# Patient Record
Sex: FEMALE | Race: WHITE
[De-identification: names, ages, dates, MRNs, and addresses within clinical notes are randomized per-mention and may not be internally consistent; named-entity substitution may affect disease eponyms.]

---

## 2018-01-24 ENCOUNTER — HOSPITAL ENCOUNTER (INPATIENT)
Dept: HOSPITAL 1 - ED | Age: 33
LOS: 3 days | Discharge: NURSING, REHAB OR LONG-TERM CARE | DRG: 342 | End: 2018-01-27
Attending: FAMILY MEDICINE | Admitting: FAMILY MEDICINE
Payer: MEDICAID

## 2018-01-24 VITALS — DIASTOLIC BLOOD PRESSURE: 83 MMHG | SYSTOLIC BLOOD PRESSURE: 119 MMHG

## 2018-01-24 VITALS
BODY MASS INDEX: 18.91 KG/M2 | HEIGHT: 60 IN | WEIGHT: 96.3 LBS | BODY MASS INDEX: 18.91 KG/M2 | WEIGHT: 96.3 LBS | HEIGHT: 60 IN

## 2018-01-24 VITALS — DIASTOLIC BLOOD PRESSURE: 74 MMHG | SYSTOLIC BLOOD PRESSURE: 113 MMHG

## 2018-01-24 VITALS — SYSTOLIC BLOOD PRESSURE: 113 MMHG | DIASTOLIC BLOOD PRESSURE: 68 MMHG

## 2018-01-24 DIAGNOSIS — E44.0: ICD-10-CM

## 2018-01-24 DIAGNOSIS — G80.9: ICD-10-CM

## 2018-01-24 DIAGNOSIS — S42.292A: Primary | ICD-10-CM
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
## 2018-01-24 DIAGNOSIS — F84.0: ICD-10-CM

## 2018-01-24 DIAGNOSIS — E03.9: ICD-10-CM

## 2018-01-24 DIAGNOSIS — F73: ICD-10-CM
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
## 2018-01-24 DIAGNOSIS — E87.8: ICD-10-CM

## 2018-01-24 DIAGNOSIS — K59.00: ICD-10-CM

## 2018-01-24 LAB
ALBUMIN SERPL-MCNC: 3 G/DL (ref 3.4–5)
ALP SERPL-CCNC: 129 U/L (ref 46–116)
ALT SERPL-CCNC: 29 U/L (ref 14–59)
AMYLASE SERPL-CCNC: 51 U/L (ref 25–115)
AST SERPL-CCNC: 28 U/L (ref 15–37)
BASOPHILS NFR BLD: 0.1 % (ref 0–2)
BILIRUB SERPL-MCNC: 0.34 MG/DL (ref 0.2–1)
BUN SERPL-MCNC: 11 MG/DL (ref 7–18)
CALCIUM SERPL-MCNC: 8.3 MG/DL (ref 8.5–10.1)
CHLORIDE SERPL-SCNC: 109 MMOL/L (ref 98–107)
CHOLEST SERPL-MCNC: 120 MG/DL (ref ?–200)
CHOLEST/HDLC SERPL: 1.7 MG/DL
CO2 SERPL-SCNC: 24.3 MMOL/L (ref 21–32)
CREAT SERPL-MCNC: 0.5 MG/DL (ref 0.6–1)
ERYTHROCYTE [DISTWIDTH] IN BLOOD BY AUTOMATED COUNT: 13.5 % (ref 11.5–14.5)
GFR SERPLBLD BASED ON 1.73 SQ M-ARVRAT: > 60 ML/MIN
GLUCOSE SERPL-MCNC: 121 MG/DL (ref 74–106)
HDLC SERPL-MCNC: 69 MG/DL (ref 40–60)
LIPASE SERPL-CCNC: 257 IU/L (ref 73–393)
MAGNESIUM SERPL-MCNC: 1.9 MG/DL (ref 1.8–2.4)
PHOSPHATE SERPL-MCNC: 2.8 MG/DL (ref 2.5–4.9)
PLATELET # BLD: 198 X10^3MCL (ref 130–400)
POTASSIUM SERPL-SCNC: 4.3 MMOL/L (ref 3.5–5.1)
PROT SERPL-MCNC: 6 G/DL (ref 6.4–8.2)
SODIUM SERPL-SCNC: 141 MMOL/L (ref 136–145)
T3 SERPL-MCNC: 0.65 NG/ML
T3RU NFR SERPL: 37 % UPTAKE (ref 30–39)
T4 FREE SERPL-MCNC: 0.94 NG/DL (ref 0.76–1.46)
T4 SERPL-MCNC: 6.2 UG/DL (ref 4.7–13.3)
T4/T3 UPTAKE INDEX SERPL: 2.3 UG/DL (ref 1.4–4.5)
TRIGL SERPL-MCNC: 59 MG/DL (ref ?–150)

## 2018-01-25 VITALS — DIASTOLIC BLOOD PRESSURE: 66 MMHG | SYSTOLIC BLOOD PRESSURE: 125 MMHG

## 2018-01-25 VITALS — SYSTOLIC BLOOD PRESSURE: 134 MMHG | DIASTOLIC BLOOD PRESSURE: 77 MMHG

## 2018-01-25 VITALS — SYSTOLIC BLOOD PRESSURE: 112 MMHG | DIASTOLIC BLOOD PRESSURE: 67 MMHG

## 2018-01-25 LAB
BASOPHILS NFR BLD: 0.2 % (ref 0–2)
BUN SERPL-MCNC: 10 MG/DL (ref 7–18)
CALCIUM SERPL-MCNC: 8.6 MG/DL (ref 8.5–10.1)
CHLORIDE SERPL-SCNC: 108 MMOL/L (ref 98–107)
CO2 SERPL-SCNC: 24.9 MMOL/L (ref 21–32)
CREAT SERPL-MCNC: 0.5 MG/DL (ref 0.6–1)
ERYTHROCYTE [DISTWIDTH] IN BLOOD BY AUTOMATED COUNT: 13.4 % (ref 11.5–14.5)
GFR SERPLBLD BASED ON 1.73 SQ M-ARVRAT: > 60 ML/MIN
GLUCOSE SERPL-MCNC: 104 MG/DL (ref 74–106)
PLATELET # BLD: 187 X10^3MCL (ref 130–400)
POTASSIUM SERPL-SCNC: 4.2 MMOL/L (ref 3.5–5.1)
SODIUM SERPL-SCNC: 141 MMOL/L (ref 136–145)

## 2018-01-26 VITALS — DIASTOLIC BLOOD PRESSURE: 72 MMHG | SYSTOLIC BLOOD PRESSURE: 138 MMHG

## 2018-01-26 VITALS — DIASTOLIC BLOOD PRESSURE: 77 MMHG | SYSTOLIC BLOOD PRESSURE: 140 MMHG

## 2018-01-26 VITALS — SYSTOLIC BLOOD PRESSURE: 136 MMHG | DIASTOLIC BLOOD PRESSURE: 94 MMHG

## 2018-01-26 LAB
BASOPHILS NFR BLD: 0.3 % (ref 0–2)
BUN SERPL-MCNC: 8 MG/DL (ref 7–18)
CALCIUM SERPL-MCNC: 8.7 MG/DL (ref 8.5–10.1)
CHLORIDE SERPL-SCNC: 103 MMOL/L (ref 98–107)
CO2 SERPL-SCNC: 27.6 MMOL/L (ref 21–32)
CREAT SERPL-MCNC: 0.4 MG/DL (ref 0.6–1)
ERYTHROCYTE [DISTWIDTH] IN BLOOD BY AUTOMATED COUNT: 13.6 % (ref 11.5–14.5)
GFR SERPLBLD BASED ON 1.73 SQ M-ARVRAT: > 60 ML/MIN
GLUCOSE SERPL-MCNC: 94 MG/DL (ref 74–106)
PLATELET # BLD: 189 X10^3MCL (ref 130–400)
POTASSIUM SERPL-SCNC: 3.4 MMOL/L (ref 3.5–5.1)
SODIUM SERPL-SCNC: 129 MMOL/L (ref 136–145)

## 2018-01-27 VITALS — SYSTOLIC BLOOD PRESSURE: 133 MMHG | DIASTOLIC BLOOD PRESSURE: 77 MMHG

## 2018-01-27 VITALS — SYSTOLIC BLOOD PRESSURE: 135 MMHG | DIASTOLIC BLOOD PRESSURE: 82 MMHG

## 2018-01-27 VITALS — DIASTOLIC BLOOD PRESSURE: 80 MMHG | SYSTOLIC BLOOD PRESSURE: 142 MMHG

## 2018-01-27 LAB
BASOPHILS NFR BLD: 0.3 % (ref 0–2)
BUN SERPL-MCNC: 8 MG/DL (ref 7–18)
CALCIUM SERPL-MCNC: 8.8 MG/DL (ref 8.5–10.1)
CHLORIDE SERPL-SCNC: 108 MMOL/L (ref 98–107)
CO2 SERPL-SCNC: 24.8 MMOL/L (ref 21–32)
CREAT SERPL-MCNC: 0.5 MG/DL (ref 0.6–1)
ERYTHROCYTE [DISTWIDTH] IN BLOOD BY AUTOMATED COUNT: 13.5 % (ref 11.5–14.5)
GFR SERPLBLD BASED ON 1.73 SQ M-ARVRAT: > 60 ML/MIN
GLUCOSE SERPL-MCNC: 111 MG/DL (ref 74–106)
PLATELET # BLD: 217 X10^3MCL (ref 130–400)
POTASSIUM SERPL-SCNC: 4 MMOL/L (ref 3.5–5.1)
SODIUM SERPL-SCNC: 144 MMOL/L (ref 136–145)

## 2018-04-04 ENCOUNTER — HOSPITAL ENCOUNTER (EMERGENCY)
Dept: HOSPITAL 1 - ED | Age: 33
LOS: 1 days | Discharge: HOME | End: 2018-04-05
Payer: MEDICAID

## 2018-04-04 VITALS — SYSTOLIC BLOOD PRESSURE: 116 MMHG | DIASTOLIC BLOOD PRESSURE: 101 MMHG

## 2018-04-04 VITALS — WEIGHT: 88.12 LBS | BODY MASS INDEX: 18.5 KG/M2 | HEIGHT: 57.99 IN
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
## 2018-04-04 DIAGNOSIS — S00.31XA: Primary | ICD-10-CM

## 2018-04-04 DIAGNOSIS — M25.512: ICD-10-CM

## 2018-04-04 DIAGNOSIS — F84.0: ICD-10-CM
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
## 2018-04-04 DIAGNOSIS — G89.18: ICD-10-CM

## 2018-04-04 DIAGNOSIS — F79: ICD-10-CM

## 2018-08-10 ENCOUNTER — HOSPITAL ENCOUNTER (EMERGENCY)
Dept: HOSPITAL 1 - ED | Age: 33
LOS: 1 days | Discharge: HOME | End: 2018-08-11
Payer: MEDICAID

## 2018-08-10 VITALS — WEIGHT: 110 LBS | HEIGHT: 60 IN | BODY MASS INDEX: 21.6 KG/M2
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
## 2018-08-10 DIAGNOSIS — S01.112A: Primary | ICD-10-CM
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
## 2018-08-10 DIAGNOSIS — S09.90XA: ICD-10-CM

## 2018-08-11 VITALS — DIASTOLIC BLOOD PRESSURE: 97 MMHG | SYSTOLIC BLOOD PRESSURE: 136 MMHG

## 2018-12-25 ENCOUNTER — HOSPITAL ENCOUNTER (EMERGENCY)
Dept: HOSPITAL 1 - ED | Age: 33
Discharge: HOME | End: 2018-12-25
Payer: MEDICAID

## 2018-12-25 VITALS — WEIGHT: 81 LBS | BODY MASS INDEX: 14.35 KG/M2 | HEIGHT: 63 IN

## 2018-12-25 VITALS — DIASTOLIC BLOOD PRESSURE: 81 MMHG | SYSTOLIC BLOOD PRESSURE: 144 MMHG

## 2018-12-25 DIAGNOSIS — S00.33XA: ICD-10-CM

## 2018-12-25 DIAGNOSIS — F84.0: ICD-10-CM
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
## 2018-12-25 DIAGNOSIS — S00.03XA: ICD-10-CM
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
## 2018-12-25 DIAGNOSIS — S00.11XA: Primary | ICD-10-CM
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
## 2018-12-25 PROCEDURE — G0500 MOD SEDAT ENDO SERVICE >5YRS: HCPCS

## 2019-06-26 ENCOUNTER — HOSPITAL ENCOUNTER (EMERGENCY)
Dept: HOSPITAL 1 - ED | Age: 34
Discharge: HOME | End: 2019-06-26
Payer: COMMERCIAL

## 2019-06-26 VITALS — DIASTOLIC BLOOD PRESSURE: 81 MMHG | SYSTOLIC BLOOD PRESSURE: 120 MMHG

## 2019-06-26 VITALS — HEIGHT: 60 IN | BODY MASS INDEX: 15.9 KG/M2 | WEIGHT: 81 LBS

## 2019-06-26 DIAGNOSIS — R53.1: ICD-10-CM

## 2019-06-26 DIAGNOSIS — F84.0: ICD-10-CM

## 2019-06-26 DIAGNOSIS — Z79.899: ICD-10-CM

## 2019-06-26 DIAGNOSIS — E86.0: Primary | ICD-10-CM

## 2019-06-26 LAB
ALBUMIN SERPL-MCNC: 2.7 G/DL (ref 3.4–5)
ALP SERPL-CCNC: 117 U/L (ref 46–116)
ALT SERPL-CCNC: 31 U/L (ref 14–59)
AST SERPL-CCNC: 19 U/L (ref 15–37)
BASOPHILS NFR BLD: 0.3 % (ref 0–2)
BILIRUB SERPL-MCNC: 0.26 MG/DL (ref 0.2–1)
BUN SERPL-MCNC: 12 MG/DL (ref 7–18)
CALCIUM SERPL-MCNC: 9.3 MG/DL (ref 8.5–10.1)
CHLORIDE SERPL-SCNC: 105 MMOL/L (ref 98–107)
CO2 SERPL-SCNC: 28.2 MMOL/L (ref 21–32)
CREAT SERPL-MCNC: 0.6 MG/DL (ref 0.6–1)
ERYTHROCYTE [DISTWIDTH] IN BLOOD BY AUTOMATED COUNT: 13.8 % (ref 11.5–14.5)
GFR SERPLBLD BASED ON 1.73 SQ M-ARVRAT: > 60 ML/MIN
GLUCOSE SERPL-MCNC: 101 MG/DL (ref 74–106)
MICROSCOPIC UR-IMP: NO
PLATELET # BLD: 275 X10^3MCL (ref 130–400)
POTASSIUM SERPL-SCNC: 3.9 MMOL/L (ref 3.5–5.1)
PROT SERPL-MCNC: 6.2 G/DL (ref 6.4–8.2)
RBC # UR STRIP.AUTO: NEGATIVE /UL
SODIUM SERPL-SCNC: 139 MMOL/L (ref 136–145)
UA SPECIFIC GRAVITY: 1.01 (ref 1–1.03)

## 2019-07-16 ENCOUNTER — HOSPITAL ENCOUNTER (EMERGENCY)
Dept: HOSPITAL 1 - ED | Age: 34
Discharge: HOME | End: 2019-07-16
Payer: COMMERCIAL

## 2019-07-16 VITALS — SYSTOLIC BLOOD PRESSURE: 125 MMHG | DIASTOLIC BLOOD PRESSURE: 89 MMHG

## 2019-07-16 VITALS — WEIGHT: 89 LBS | HEIGHT: 61 IN | BODY MASS INDEX: 16.8 KG/M2

## 2019-07-16 DIAGNOSIS — F79: ICD-10-CM

## 2019-07-16 DIAGNOSIS — R45.1: ICD-10-CM

## 2019-07-16 DIAGNOSIS — S01.81XA: Primary | ICD-10-CM
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
## 2019-07-16 DIAGNOSIS — F84.0: ICD-10-CM
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
## 2019-09-13 ENCOUNTER — HOSPITAL ENCOUNTER (INPATIENT)
Dept: HOSPITAL 1 - ED | Age: 34
LOS: 3 days | Discharge: NURSING, REHAB OR LONG-TERM CARE | DRG: 720 | End: 2019-09-16
Attending: HOSPITALIST | Admitting: HOSPITALIST
Payer: COMMERCIAL

## 2019-09-13 VITALS
WEIGHT: 77 LBS | BODY MASS INDEX: 15.12 KG/M2 | BODY MASS INDEX: 15.12 KG/M2 | HEIGHT: 60 IN | WEIGHT: 77 LBS | HEIGHT: 60 IN

## 2019-09-13 VITALS — DIASTOLIC BLOOD PRESSURE: 79 MMHG | SYSTOLIC BLOOD PRESSURE: 131 MMHG

## 2019-09-13 DIAGNOSIS — F29: ICD-10-CM

## 2019-09-13 DIAGNOSIS — A41.9: Primary | ICD-10-CM

## 2019-09-13 DIAGNOSIS — E87.0: ICD-10-CM

## 2019-09-13 DIAGNOSIS — E43: ICD-10-CM

## 2019-09-13 DIAGNOSIS — J69.0: ICD-10-CM

## 2019-09-13 DIAGNOSIS — G25.9: ICD-10-CM

## 2019-09-13 DIAGNOSIS — E03.9: ICD-10-CM

## 2019-09-13 DIAGNOSIS — D64.9: ICD-10-CM

## 2019-09-13 DIAGNOSIS — E83.42: ICD-10-CM

## 2019-09-13 DIAGNOSIS — G80.9: ICD-10-CM
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
## 2019-09-13 DIAGNOSIS — Z22.322: ICD-10-CM

## 2019-09-13 DIAGNOSIS — G47.00: ICD-10-CM
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
## 2019-09-13 DIAGNOSIS — F84.0: ICD-10-CM

## 2019-09-13 DIAGNOSIS — F73: ICD-10-CM

## 2019-09-13 DIAGNOSIS — S01.01XA: ICD-10-CM

## 2019-09-13 DIAGNOSIS — G40.909: ICD-10-CM

## 2019-09-13 DIAGNOSIS — E83.51: ICD-10-CM

## 2019-09-13 LAB
ALBUMIN SERPL-MCNC: 3 G/DL (ref 3.4–5)
ALP SERPL-CCNC: 130 U/L (ref 46–116)
ALT SERPL-CCNC: 45 U/L (ref 14–59)
AMPHETAMINES UR QL SCN: (no result)
AST SERPL-CCNC: 39 U/L (ref 15–37)
BASOPHILS NFR BLD: 0.1 % (ref 0–2)
BILIRUB SERPL-MCNC: 0.5 MG/DL (ref 0.2–1)
BUN SERPL-MCNC: 12 MG/DL (ref 7–18)
CALCIUM SERPL-MCNC: 7.5 MG/DL (ref 8.5–10.1)
CHLORIDE SERPL-SCNC: 113 MMOL/L (ref 98–107)
CHOLEST SERPL-MCNC: 114 MG/DL (ref ?–200)
CHOLEST SERPL-MCNC: 116 MG/DL (ref ?–200)
CHOLEST/HDLC SERPL: 1.6 MG/DL
CO2 SERPL-SCNC: 23.2 MMOL/L (ref 21–32)
CREAT SERPL-MCNC: 0.8 MG/DL (ref 0.6–1)
ERYTHROCYTE [DISTWIDTH] IN BLOOD BY AUTOMATED COUNT: 14.1 % (ref 11.5–14.5)
GFR SERPLBLD BASED ON 1.73 SQ M-ARVRAT: > 60 ML/MIN
GLUCOSE SERPL-MCNC: 78 MG/DL (ref 74–106)
HDLC SERPL-MCNC: 72 MG/DL (ref 40–60)
HDLC SERPL-MCNC: 74 MG/DL (ref 40–60)
LIPASE SERPL-CCNC: 112 IU/L (ref 73–393)
MAGNESIUM SERPL-MCNC: 1.6 MG/DL (ref 1.8–2.4)
MICROSCOPIC UR-IMP: NO
PLATELET # BLD: 199 X10^3MCL (ref 130–400)
POTASSIUM SERPL-SCNC: 3.5 MMOL/L (ref 3.5–5.1)
PROT SERPL-MCNC: 5.9 G/DL (ref 6.4–8.2)
RBC # UR STRIP.AUTO: NEGATIVE /UL
SODIUM SERPL-SCNC: 148 MMOL/L (ref 136–145)
T4 SERPL-MCNC: 6.3 UG/DL (ref 4.7–13.3)
TRIGL SERPL-MCNC: 54 MG/DL (ref ?–150)
UA SPECIFIC GRAVITY: 1.02 (ref 1–1.03)

## 2019-09-13 PROCEDURE — G0480 DRUG TEST DEF 1-7 CLASSES: HCPCS

## 2019-09-13 PROCEDURE — G0378 HOSPITAL OBSERVATION PER HR: HCPCS

## 2019-09-13 PROCEDURE — 0JQ03ZZ REPAIR SCALP SUBCUTANEOUS TISSUE AND FASCIA, PERCUTANEOUS APPROACH: ICD-10-PCS | Performed by: EMERGENCY MEDICINE

## 2019-09-13 NOTE — NUR
PT BIB [facility] S/P HITTING HEAD ON WALL AND SUSTAINING A LAC TO L FRONTAL
LOBE. PER [facility] PT DID NOT SUSTAIN LOC. PT HAS HX OF AUTISM AND MR AND IS
THRASHING AROUND AND SCREAMING. PER [facility] THIS IS PT NORMAL BEHAVIOR
ESPECIALLY AT HOSPITAL. PER [facility] PT NORMALLY THRASHES AROUND AND IT IS
NORMAL THAT SHE HITS HER HEAD. PT NOTED WITH A FEVER AT TRIAGE AND PER
[facility] PT HAS NOT BEEN SICK LATELY. BREATHING EVEN AND UNLABORED. PT NOTED
TO BE WEARING ADULT DIAPERS AS WELL. WILL CONTINUE TO MONITOR.

## 2019-09-13 NOTE — NUR
PT RECIEVED FROM ROBBIN RN, PT ALERT AND NONVERBAL. NO S/S OF PAIN NOTED AT
THIS TIME. PT EXTREMELY AGITATED AT THIS TIME. THRASHING AROUND AT THIS TIME.
SYDNIE WRISTS AND L ANKLE RESTRAINED AT THIS TIME. PT KICKING AND SCREAMING.
ATTEMPTING TO BITE NURSES NEAR. REORIENTED AND ATTEMPTED TO DISTRACT. PT
CONTINUED TO REMAIN AGITATED. SZ PRECAUTIONS IN PLACE AT THIS TIME. PT 1:1,
SITTER AT BEDSIDE. BED AT LOWEST POSITION. SIDE RAILS X4 IN PLACE. WILL
CONTINUE TO MONITOR.

## 2019-09-13 NOTE — NUR
IV ATIVAN AND IM HALDOL GIVEN AGAIN PER DR BHATT FOR CONTINUED THRASHING IN
BED AND BANGING OF HEAD ON PILLOW. [facility] AT BEDSIDE. 4 POINT RESTRAINTS
IN PLACE FOR PT SAFETY.

## 2019-09-13 NOTE — NUR
RECEIVED PT FROM ER, PT ADMIT FOR PNA, PT IS ALERT BUT NON VERBAL, PT IS VERY
AGITATED, CONSTANTLY SCREAMING, TRYING TO THROW HER BODY OFF THE BED. HIT HER
HEAD TO BED FRAME. SCRATCH ALL STAFFS NEAR HER. KICKING. PT IS CURRENTLY ON
SYDNIE WRIST RESTRAIN. UNABLE TO AUSCULTATE LUNG SOUND BECAUSE PT CONSTANTLY
SCREAMING. PO2 95% IN ROOM AIR, PT IS ON TELE 5, ST, BOWEL SOUND PRESENT ALL 4
QUADRANTS, NO DISTENTION, NO TENDER. PEDAL PULSE PRESENT BOTH FEET, NO EDEMA,
IV AT LEFT AC, NO LEAKING, NO INFITLRAITON. ALL ADLS ASSIST, SITTER AT
BEDSIDE. ALL ADLS ASSIST, ALL NEED MET, CALL LIGHT IN REACH, WILL CONTINUE TO
MONITOR.

## 2019-09-13 NOTE — NUR
REMOVED SYDNIE LEG RESTRAINTS. PT REPOSITIONED TO TOP OF BED FOR BETTER COMFORT.
PT CONTINUING TO TRASH AROUND/KICK/YELL. [facility] AT BEDSIDE.

## 2019-09-13 NOTE — NUR
PT PLACED IN 4 POINT RESTRINTS DUE TO THRASHING AROUND AND PER [facility] PT
WILL CONTINUE TO BANG HEAD AND TRHASH ARUND AND HARM SELF WITHOUT RESTRAINTS

## 2019-09-13 NOTE — NUR
PT CONTINUES TO OCCASIONALLY TRASH AROUND BED WITH BANGING OF HEAD AGAINST
PILLOW/MATTRESS AND YELLING. [facility] AT BEDSIDE.

## 2019-09-14 VITALS — DIASTOLIC BLOOD PRESSURE: 87 MMHG | SYSTOLIC BLOOD PRESSURE: 126 MMHG

## 2019-09-14 VITALS — DIASTOLIC BLOOD PRESSURE: 82 MMHG | SYSTOLIC BLOOD PRESSURE: 116 MMHG

## 2019-09-14 VITALS — SYSTOLIC BLOOD PRESSURE: 127 MMHG | DIASTOLIC BLOOD PRESSURE: 86 MMHG

## 2019-09-14 VITALS — DIASTOLIC BLOOD PRESSURE: 86 MMHG | SYSTOLIC BLOOD PRESSURE: 131 MMHG

## 2019-09-14 LAB
BASOPHILS NFR BLD: 0.1 % (ref 0–2)
BUN SERPL-MCNC: 8 MG/DL (ref 7–18)
CALCIUM SERPL-MCNC: 7.3 MG/DL (ref 8.5–10.1)
CHLORIDE SERPL-SCNC: 115 MMOL/L (ref 98–107)
CO2 SERPL-SCNC: 24.1 MMOL/L (ref 21–32)
CREAT SERPL-MCNC: 0.7 MG/DL (ref 0.6–1)
ERYTHROCYTE [DISTWIDTH] IN BLOOD BY AUTOMATED COUNT: 14 % (ref 11.5–14.5)
GFR SERPLBLD BASED ON 1.73 SQ M-ARVRAT: > 60 ML/MIN
GLUCOSE SERPL-MCNC: 132 MG/DL (ref 74–106)
PLATELET # BLD: 188 X10^3MCL (ref 130–400)
POTASSIUM SERPL-SCNC: 3 MMOL/L (ref 3.5–5.1)
SODIUM SERPL-SCNC: 150 MMOL/L (ref 136–145)

## 2019-09-14 NOTE — NUR
PT RESTING AT THIS TIME. HAS BEEN SLEEPING ON AND OFF FOR PAST 3 HRS, HAD BEEN
COMBATIVE AND AGITATED ALL SHIFT, ATTEMPTING TO SCRATCH, BITE AND KICK STAFF.
ATTEMPTS TO PULL ON LINES AND BANG HEAD ON BED. CONSTANTLY MOVING IN BED AND
AGITATED. REMAINS ON VALCRO RESTRAINTS TO BUE AND SOFT TO LT ANCKLE WITH BED
RAILS UP X4 FOR SAFETY. SITTER AT BEDSIDE FOR SAFETY. IVF INFUSING TO LFA.
CALL LIGHT IN REACH NEEDS ANTICIPATED.

## 2019-09-14 NOTE — NUR
PT STILL AGITATED IN BED AT THIS TIME. CONTINING TO THRASH AND KICK. COMFORT
MEASURES PROVIDED, NO POSITIVE RESPONSE ACHIEVED. PT IN THREE POINT RESTRAINTS
AT THIS TIME. SITTER AT BEDSIDE. BED AT LOWEST POSITION, SZ PRECAUTIONS IN
PLACE. WILL ENDORSE TO DAY NURSE.

## 2019-09-14 NOTE — NUR
RESIDENT MADE AWARE VIA [facility] OF  TRENSDING UP PT CURRENTLY ON NS.
INQUIRED IF SHE WOULD LIKE TO CHANGE IVF. AWAITING FURTHER ORDERS.

## 2019-09-14 NOTE — NUR
AWAITING FOR PHARMACY TO DELIVER K-RIDER. PT RESTING WITH EYES CLOSED AT THIS
TIME. COMFORTABLE IN NO APPARENT DISTRESS. CALLS OUT OCC. CALL LIGHT IN REACH.
SITTER AT BEDSIDE.

## 2019-09-14 NOTE — NUR
PT NOTED WITH LESS AGITATION. RANDOMLY SCREAMING. WHEN TOUCHED ATTEMPTS TO
SCRATCH, BITE OR KICK STAFF. SITTER AT BEDSIDE FOR SAFETY. PT NOTED WITH
SLIGHT IMPROVEMENT FROM THIS MORNING WHEN EPISODES WHERE CONSTANT. NOW THEY
ARE OCCURING LESS FREQUETLY. NO NEED FOR PRN MEDS AT THIS TIME. CALL LIGHT IN
REACH NEEDS ANTICIPATED.

## 2019-09-14 NOTE — NUR
PT. SLEEPING, EASY TO WAKE. RESPONDS TO TACTILE STIMULI. PT. W/ HX OF AUTISM
AND MR. PT. ALSO APHASIC. PT. ON SEIZURE PRECAUTION W/ SIDERAILS PADDED FOR
PT. SAFETY. THREE POINT RESTRAINTS STILL IN PLACE, BUE AND LLE. PT. NEEDS
ANTICIPATED. SITTER AT BEDSIDE. BREATH SOUNDS CLEAR THROUGHOUT LUNG MEZA,
RESP. EVEN, UNLABORED. BLL DIMINISHED. PT. ON RA. NO SOB NOTED. NSR ON TELE
#5, NO ECTOPIES NOTED. PEDAL PULSES STRONG TO BLE. NO EDEMA NOTED. SMALL
LACERATION W/ STAPLES DELMI, TO LT. FOREHEAD. NO DRAINAGE NOTED. NO S/S OF
INFECTION. SCATTERED ECCYMOSIS TO BUE AND BLE. ABD. SOFT AND FLAT, BOWEL
SOUNDS ACTIVE. IVF D5W INFUSING AT THIS TIME. PT. NEEDS ANTICIPATED.

## 2019-09-14 NOTE — NUR
RECEIVED PT IN BED AWAKE, NONVERBAL. PT UNABLE TO FOLLOW DIRRECTIONS OR
VERBALIZE NEEDS. SCREAMS WHEN TOUCHED OR SPOKEN TO. PT HAS BEEN AGITATED AND
COMBATIVE TRASHING AROUND IN BED. KICKING STAFF, SCRATCHING AND ATTEMPTING TO
BITE STAFF. PT HAS ALSO ATTEMPTED AT LINES AND BANGING HEAD ON BED. RECEIVED
PT IN 3 POINT RESTRAINTS. SOFT TO LT ANCKLE AND VALCRO TO BILAT WRIST. WAS
MADE AWARE THAT PT HAS BEEN RIPPING THROUGH SOFT WRIST RESTRAINTS. RESP EVEN
AND UNLABORED ON RA. UNABLE TO AUSCULTATE D/T MOVEMENT AND SCREAMING. ON TELE
#5 SHOWING ST -110. NO EDEMA NOTED WITH IV TO LFA INFUSING NS AT
100ML/HR. ABD SOFT, NONTENDER WITH ACTIVE BS X4. PT INCONTINENT OF B+B. ABLE
TO MOVE ALL EXTREMETIES BUT UNABLE TO EVALUATE AMBULATION AT THIS TIME.
PT NOTED WITH ECCHYMOSIS TO BLE FROM BANGING LEG ON BED RAILS WHILE TRYING TO
KICK STAFF. PT WITH PADDED RAILS X4 WITH 4 RAILS UP PER RESTRAINT ORDERS.
SITTER AT BEDSIDE. CALL LIGHT IN REACH NEEDS ANTICIPATED.

## 2019-09-15 VITALS — SYSTOLIC BLOOD PRESSURE: 148 MMHG | DIASTOLIC BLOOD PRESSURE: 98 MMHG

## 2019-09-15 VITALS — DIASTOLIC BLOOD PRESSURE: 96 MMHG | SYSTOLIC BLOOD PRESSURE: 146 MMHG

## 2019-09-15 VITALS — SYSTOLIC BLOOD PRESSURE: 123 MMHG | DIASTOLIC BLOOD PRESSURE: 85 MMHG

## 2019-09-15 VITALS — SYSTOLIC BLOOD PRESSURE: 146 MMHG | DIASTOLIC BLOOD PRESSURE: 96 MMHG

## 2019-09-15 VITALS — SYSTOLIC BLOOD PRESSURE: 145 MMHG | DIASTOLIC BLOOD PRESSURE: 89 MMHG

## 2019-09-15 LAB
BASOPHILS NFR BLD: 0.3 % (ref 0–2)
BUN SERPL-MCNC: 2 MG/DL (ref 7–18)
CALCIUM SERPL-MCNC: 7.4 MG/DL (ref 8.5–10.1)
CHLORIDE SERPL-SCNC: 111 MMOL/L (ref 98–107)
CO2 SERPL-SCNC: 27.3 MMOL/L (ref 21–32)
CREAT SERPL-MCNC: 0.4 MG/DL (ref 0.6–1)
ERYTHROCYTE [DISTWIDTH] IN BLOOD BY AUTOMATED COUNT: 14.1 % (ref 11.5–14.5)
GFR SERPLBLD BASED ON 1.73 SQ M-ARVRAT: > 60 ML/MIN
GLUCOSE SERPL-MCNC: 92 MG/DL (ref 74–106)
MAGNESIUM SERPL-MCNC: 1.6 MG/DL (ref 1.8–2.4)
PHOSPHATE SERPL-MCNC: 1.8 MG/DL (ref 2.5–4.9)
PLATELET # BLD: 192 X10^3MCL (ref 130–400)
POTASSIUM SERPL-SCNC: 2.4 MMOL/L (ref 3.5–5.1)
SODIUM SERPL-SCNC: 145 MMOL/L (ref 136–145)

## 2019-09-15 NOTE — NUR
PT. AWAKE, THRASHING AROUND IN BED AT TIMES. PT. REMAINS WITH THREE POINT
RESTRAINTS IN PLACE. RELEASED PER PROTOCOL. WHEN RESTRAINTS ARE RELEASED, PT.
ATTEMPTS TO KICK, HIT AND PINCHING STAFF. HYDRATION AND TOILETING DONE Q2HRS
AND PRN. SITTER REMAINS AT BEDSIDE. IV SITE INTACT. WILL CONTINUE TO MONITOR.

## 2019-09-15 NOTE — NUR
SITTER REMAINS AT BEDSIDE. BUE VELCRO RESTRAINTS IN-SITU, LLE RESTRAINTS IN
PLACE. IVF INFUSING WELL, SITE REMAINS INTACT. NO SEIZURE ACTIVITES NOTED
THROUGHOUT NIGHT. WILL ENDORSE PT. CARE TO INCOMING NURSE.

## 2019-09-15 NOTE — NUR
PT. SLEEPING AT THIS TIME. RESPONDS TO TACTILE STIMULI. PT. ON THREE POINT
RESTRAINTS. BREATH SOUNDS CLEAR THROUGHOUT LUNG MEZA, RESP. EVEN, UNLABORED.
BLL DIMINISHED. NO SOB NOTED. PT. ON ROOMAIR. SCATTERED ECCYMOSIS TO BUE AND
BLE. LACERATION TO LT. FOREHEAD HAIRLINE DELMI, NO DRAINAGE, NO S/S OF
INFECTION. IV SITE INTACT. NO EDEMA NOTED TO EXTREMITIES. PEDAL PULSES STRONG
BLE. ABD. SOFT AND FLAT. BOWEL SOUNDS ACTIVE. SITTER AT BEDSIDE. PT. IN FULL
VIEW OF PT.'S ROOM.

## 2019-09-15 NOTE — NUR
PT STABLE WITH NO DISTRESS NOTED. SITTER REMAINS AT BEDSIDE. BUE SOFT VELCRO
RESTRAINTS IN-SITU, LLE RESTRAINT IN PLACE. IVF INFUSING WELL, SITE REMAINS
INTACT/PATENT. NO SEIZURE ACTIVITES NOTED THROUGHOUT DAY. WILL ENDORSE PT.
CARE TO INCOMING NURSE.

## 2019-09-15 NOTE — NUR
RECEIVED PT IN BED AWAKE, NONVERBAL. PT UNABLE TO FOLLOW DIRRECTIONS OR
VERBALIZE NEEDS. SCREAMS INTERMITTENTLY. NIGHT NURSE REPORTS PT HAS BEEN
AGITATED AND COMBATIVE IN BED AND THAT SHE HAS ALSO ATTEMPTED AT LINES AND
BANGING HEAD ON BED. RECEIVED PT IN 3 POINT RESTRAINTS. SOFT TO LT ANCKLE AND
VALCRO TO BILAT WRIST. RR EVEN AND UNLABORED ON RA. UNABLE TO AUSCULTATE D/T
MOVEMENT AND SCREAMING. PT ON TELE #5 SHOWING ST HR 92. NO EDEMA NOTED
WITH IV TO LFA INFUSING D5-45%NS AT 100ML/HR. ABD SOFT, NONTENDER WITH ACTIVE
BS X4.  PT INCONTINENT OF B+B. ABLE TO MOVE ALL EXTREMETIES BUT UNABLE TO
EVALUATE AMBULATION AT THIS TIME. PT NOTED WITH ECCHYMOSIS TO BLE FROM BANGING
LEG ON BED RAILS. SITTER AT BEDSIDE ALL SHIFT. SAFETY PRECAUTIONS IN PLACE,
CALL LIGHT WITHIN REACH, WILL MONITOR.
 
 
 
 
KICK STAFF. PT WITH PADDED RAILS X4 WITH 4 RAILS UP PER RESTRAINT ORDERS.
SITTER AT BEDSIDE. CALL LIGHT IN REACH NEEDS ANTICIPATED.

## 2019-09-15 NOTE — NUR
PT POTASSIUM LEVEL AT 2.4. AUGUSTO WAKEFIELD NOTIFIED AND ORDERED POTASSIUM COVERAGE.
WILL CARRY OUT NEW ORDERS.

## 2019-09-15 NOTE — NUR
PT STABLE IN BED AT THIS TIME. TOLERATING ALL CARES WELL.  SITTER AT BEDSIDE.
NO DISTRESS NOTED. SAFETY PREACUTIONS IN PLACE, CALL LIGHT WITHIN REACH, WILL
CONTINUE TO MONITOR.

## 2019-09-15 NOTE — NUR
PT STABLE IN BED AT THIS TIME. SITTER AT BEDSIDE. NO DISTRESS NOTED. SAFETY
PREACUTIONS IN PLACE, CALL LIGHT WITHIN REACH, WILL CONTINUE TO MONITOR.

## 2019-09-16 VITALS — SYSTOLIC BLOOD PRESSURE: 104 MMHG | DIASTOLIC BLOOD PRESSURE: 62 MMHG

## 2019-09-16 VITALS — DIASTOLIC BLOOD PRESSURE: 79 MMHG | SYSTOLIC BLOOD PRESSURE: 116 MMHG

## 2019-09-16 VITALS — SYSTOLIC BLOOD PRESSURE: 146 MMHG | DIASTOLIC BLOOD PRESSURE: 98 MMHG

## 2019-09-16 LAB
BASOPHILS NFR BLD: 0.5 % (ref 0–2)
BUN SERPL-MCNC: 3 MG/DL (ref 7–18)
CALCIUM SERPL-MCNC: 8.4 MG/DL (ref 8.5–10.1)
CHLORIDE SERPL-SCNC: 109 MMOL/L (ref 98–107)
CO2 SERPL-SCNC: 27 MMOL/L (ref 21–32)
CREAT SERPL-MCNC: 0.4 MG/DL (ref 0.6–1)
ERYTHROCYTE [DISTWIDTH] IN BLOOD BY AUTOMATED COUNT: 14.4 % (ref 11.5–14.5)
GFR SERPLBLD BASED ON 1.73 SQ M-ARVRAT: > 60 ML/MIN
GLUCOSE SERPL-MCNC: 97 MG/DL (ref 74–106)
MAGNESIUM SERPL-MCNC: 1.3 MG/DL (ref 1.8–2.4)
PLATELET # BLD: 179 X10^3MCL (ref 130–400)
POTASSIUM SERPL-SCNC: 3.3 MMOL/L (ref 3.5–5.1)
SODIUM SERPL-SCNC: 147 MMOL/L (ref 136–145)

## 2019-09-16 NOTE — NUR
RECEIVED PT IN NO ACUTE DISTRESS. CONTACT ISOLATION. AWAKE, RESPONDS TO
TACTILE STIMULI.  SPORADICALLY SCREAMS. ON SEIZURE PRECAUTIONS. RESP EVEN AND
UNLABORED ON RA.  LACERATION WITH STAPLES TO L FOREHEAD HAIRLINE, DELMI, NO
DRAINAGE. SCATTERED ECCHYMOSIS BUE/BLE, DELMI. ON 3 POINT RESTRAINTS. SITTER AT
BEDSIDE. FALL AND ASPIRATION PRECAUTIONS IN PLACE. IVF INFUSING TO LFA, NO
REDNESS OR SWELLING.  BED IN LOW POSITION, CALL LIGHT WITHIN REACH. WILL
CONTINUE TO MONITOR.

## 2019-09-16 NOTE — NUR
Initial Nutrition Assessment: 246T/B TRACEY PICKENS IA HR
 
Dx: Head injury, PNA
PMHx: Autism, Profound Mental Retardation, Seizures and Cerebral Palsy
PSHx: none
Labs: NA 147H, K 3.3L, BUN 3.0L, CREAT 0.4L, MG 1.3L, HGB 11.6L
Meds: Ativan, Cogentin, Colace, NaCl, synthyroid, Zofran, zosyn
Diet: mechanically soft-chopped
PO Intake: (9/15) dinner 50%, lunch 40%, breakfast 80%, (9/14) dinner 20%,
lunch 60%
Ht: 152.4 cm (60")   Wt: 34.9 kg (77#)  BMI: 15 kg/m2   Bed scale: 84.5#
IBW: 100# (45 kg) %IBW: 77  UBW: unable to access
Age: 34/F
Food Allergies: NKFA
Skin: scattered ecchymosis to BUE, BLE extremities, lacerations w/ staples to
L forehead hairline Kian: 15
Edema: none
GI: Last BM: 9/15
 
Per H&P, Pt is a 33 YO female with PMH of Autism, Profound Mental Retardation,
Seizures and Cerebral Palsy who was BIBA from [facility] for head
trauma. Per caregiver patient hits her head repeatedly in the bed or wall due
to "behavioral problem" but today patient was noted to have head laceration
which prompted caregivers to bring her to the ER.
 
RDN Visit (9/16): Patient was sleeping with sitter at bedside. Per sitter,
patient ate 95% of her breakfast this morning which comprised of eggs, cream
of wheat, juice and Syriac toast. Patient has not had any N/V/D/C at this
time. FNS received consult for 'nutrition evaluation' on 9/15. Patient appears
severely malnourished.
 
Problem with:  N/V/D/C: none at this time per sitter
Problems with: Chewing/Swallowing: Patient on MS diet
Current appetite: good as pt eating >75% of meals
Recent wt change: unable to access  %wt change: n/a
Vitamin/Supplement use: unable to access
Special diet at home: unable to access
Physical activity: unable to access
Nutrition education given: not possible at this time
Food-drug interactions: Colace- high fiber w/4223-0093 ml fluids  Education
given: n/a
 
Estimated Nutritional Needs Based on current body weight 35 kg
 Energy: 6343-6425 kcal/d (35-40 kcal/kg)
 Protein: 42-49 g/d (1.2-1.4 g/kg) - malnutrition
 Fluid: 0152-4422 ml/d (1 ml/kcal) or per doctor
 
Nutrition Diagnosis
1. Malnutrition related to medical conditions, possible poor PO in the past as
evidenced by BMI 15 kg/m2.
 
Intervention
1. Recommend continuing mechanically soft- chopped diet.
2. Recommend Ensure Enlive BID. This will provide additional 700 kcal and 40g
protein.
Discussed recommendation with NP Miguel.
 
Monitor/Evaluate
 Goal: PO intake at least 75% of estimated needs
 Monitor: PO intake, Labs, GI function
 F/U in 3-5 days as moderate risk 9/19-21

## 2019-09-16 NOTE — NUR
PT GIVEN BEDBATH BY SAMANTHA ZULUAGA AND JOHN BUNCH. PT TOLERATED WELL. SLEEPING AT
THIS TIME BUT EASILY AROUSABLE. BREATHING EVEN AND UNLABORED ON RA. SITTER AT
BEDSIDE. WILL CONTINUE TO MONITOR.

## 2019-09-16 NOTE — NUR
PT IN NO ACUTE DISTRESS. SLEEPING BUT AROUSABLE. RESP EVEN AND UNLABORED ON
RA. IVF INFUSING, NO REDNESS OR SWELLING NOTED. FALL PRECAUTIONS. SEIZURE
PRECAUTIONS. CALL LIGHT WITHIN REACH. SITTER AT BEDSIDE. WILL CONTINUE TO
MONITOR.

## 2019-09-16 NOTE — NUR
SITTER REMAINS AT BEDSIDE. PT. MOSTLY QUIET AT THIS TIME. RESTRAINTS TO BUE
AND LLE IN-SITU. RELEASED PER PROTOCL THROUGHOUT THE NIGHT. WILL CONTINUE TO
MONITOR.

## 2019-09-16 NOTE — NUR
PT DISCHARGED BACK TO [facility] AND CARE IN NO ACUTE DISTRESS.
AWAKE, NON-VERBAL. RESP EVEN AND UNLABORED ON RA. RX GIVEN. DISCHARGE PACKET
GIVEN TO [facility]. DISCHARGE EDUCATION PROVIDED, [facility]ARE[facility] VERBALIZED
UDNERSTANDING. INSTRUCTED [facility] TO HAVE PT FOLLOW UP WITH PCP WITHIN 2-3
DAYS. IV DC'D WITH CATHETER INTACT. TELE REMOVED. PT CHANGED INTO TRANSFER
GOWN AND GIVEN TRANSFER BLANKET. DRESSING TO LFA SKIN TEAR C/D/I. BELONGINGS
WITH PT. SAMANTHA ZULUAGA ACCOMPANIED PT TO [facility]. TRANSPORTED VIA WHEELCHAIR.

## 2019-09-16 NOTE — NUR
**********PHYSICAL THERAPY DAILY NOTES CO-SIGN**********
All documentation done by the Physical Therapy Assistant for 09/16/19
has been reviewed. I agree with the documentation.
 
Reviewed/Co-Signed by: Hedy Tineo  PT
Documentation Done by:HIREN BLAIR PTA

## 2019-09-16 NOTE — NUR
1. Recommend continuing mechanically soft- chopped diet.
2. Recommend Ensure Enlive BID. This will provide additional 700 kcal and 40g
protein.
Discussed recommendation with AUGUSTO Rivera.

## 2019-09-16 NOTE — NUR
DR. WESTBROOK MADE AWARE OF PT.'S MRSA RESULTS, ORDERS FOR PROTOCOL NOW
PENDING. ALSO MADE AWARE OF NEEDED REPEAT CXR. ORDERS PLACED ACCORDINGLY.

## 2020-02-08 ENCOUNTER — HOSPITAL ENCOUNTER (EMERGENCY)
Dept: HOSPITAL 1 - ED | Age: 35
Discharge: HOME | End: 2020-02-08
Payer: COMMERCIAL

## 2020-02-08 VITALS
BODY MASS INDEX: 14.99 KG/M2 | BODY MASS INDEX: 14.99 KG/M2 | WEIGHT: 90 LBS | WEIGHT: 90 LBS | HEIGHT: 65 IN | HEIGHT: 65 IN

## 2020-02-08 DIAGNOSIS — S01.21XA: Primary | ICD-10-CM
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
